# Patient Record
Sex: MALE | Race: WHITE
[De-identification: names, ages, dates, MRNs, and addresses within clinical notes are randomized per-mention and may not be internally consistent; named-entity substitution may affect disease eponyms.]

---

## 2017-01-11 ENCOUNTER — HOSPITAL ENCOUNTER (OUTPATIENT)
Dept: HOSPITAL 39 - AMB | Age: 62
Discharge: HOME | End: 2017-01-11
Attending: INTERNAL MEDICINE
Payer: COMMERCIAL

## 2017-01-11 VITALS — OXYGEN SATURATION: 95 % | TEMPERATURE: 97.6 F | DIASTOLIC BLOOD PRESSURE: 82 MMHG | SYSTOLIC BLOOD PRESSURE: 150 MMHG

## 2017-01-11 DIAGNOSIS — K21.9: ICD-10-CM

## 2017-01-11 DIAGNOSIS — Z79.899: ICD-10-CM

## 2017-01-11 DIAGNOSIS — E78.00: ICD-10-CM

## 2017-01-11 DIAGNOSIS — Z88.5: ICD-10-CM

## 2017-01-11 DIAGNOSIS — E11.9: ICD-10-CM

## 2017-01-11 DIAGNOSIS — Z12.11: Primary | ICD-10-CM

## 2017-01-11 DIAGNOSIS — Z86.010: ICD-10-CM

## 2017-01-11 DIAGNOSIS — Z88.8: ICD-10-CM

## 2017-01-11 DIAGNOSIS — I10: ICD-10-CM

## 2017-01-11 NOTE — OP
DATE OF PROCEDURE:  01/11/17



PREPROCEDURE DIAGNOSIS:

1.  History of colonic polyp.



POSTPROCEDURE DIAGNOSIS:

1.  History of colonic polyp.



PROCEDURE:

1.  Colonoscopy to the cecum.



SURGEON:  Ben Salcido MD.



ANESTHESIA:  Monitored anesthesia care.



FINDINGS:  With the patient under adequate sedation, digital exam showed normal 
anal canal with no rectal masses.  Normal sized prostate.  The rectum was 
unremarkable.  The sigmoid colon showed no clinically significant 
diverticulosis.  Descending colon normal, splenic flexure normal, transverse 
colon normal, ascending colon and cecum were reached with ileocecal valve 
visualized.  The appendix opening was identified.  No lesions seen in the 
entire colon.  Careful examination of both flexure areas did not show any 
abnormality.  



The colon preparation was good.  Examination of the colon was done to good 
advantage.  Total scope withdrawal time was 6 minutes.



IMPRESSION:  Totally normal colonoscopy with no evidence of recurrent colonic 
polyp.



RECOMMENDATION:  Followup colonoscopy in 5 to 7 years.



#[id number]



cc:  MD Ben Still MD
MTDD

## 2017-02-11 ENCOUNTER — HOSPITAL ENCOUNTER (OUTPATIENT)
Dept: HOSPITAL 39 - GMAL | Age: 62
Discharge: HOME | End: 2017-02-11
Attending: FAMILY MEDICINE
Payer: COMMERCIAL

## 2017-02-11 DIAGNOSIS — E29.1: ICD-10-CM

## 2017-02-11 DIAGNOSIS — E11.9: ICD-10-CM

## 2017-02-11 DIAGNOSIS — I10: Primary | ICD-10-CM

## 2017-02-21 ENCOUNTER — HOSPITAL ENCOUNTER (OUTPATIENT)
Dept: HOSPITAL 39 - GMAL | Age: 62
Discharge: HOME | End: 2017-02-21
Attending: FAMILY MEDICINE
Payer: COMMERCIAL

## 2017-02-21 DIAGNOSIS — R97.20: Primary | ICD-10-CM

## 2017-05-13 ENCOUNTER — HOSPITAL ENCOUNTER (OUTPATIENT)
Dept: HOSPITAL 39 - LAB | Age: 62
Discharge: HOME | End: 2017-05-13
Attending: FAMILY MEDICINE
Payer: COMMERCIAL

## 2017-05-13 DIAGNOSIS — E55.9: ICD-10-CM

## 2017-05-13 DIAGNOSIS — E78.2: Primary | ICD-10-CM

## 2017-05-13 DIAGNOSIS — E11.9: ICD-10-CM

## 2017-05-13 DIAGNOSIS — I10: ICD-10-CM

## 2017-09-16 ENCOUNTER — HOSPITAL ENCOUNTER (OUTPATIENT)
Dept: HOSPITAL 39 - GMAL | Age: 62
Discharge: HOME | End: 2017-09-16
Attending: FAMILY MEDICINE
Payer: COMMERCIAL

## 2017-09-16 DIAGNOSIS — E78.2: Primary | ICD-10-CM

## 2017-09-16 DIAGNOSIS — I10: ICD-10-CM

## 2017-09-16 DIAGNOSIS — E11.9: ICD-10-CM

## 2018-01-20 ENCOUNTER — HOSPITAL ENCOUNTER (OUTPATIENT)
Dept: HOSPITAL 39 - LAB.O | Age: 63
Discharge: HOME | End: 2018-01-20
Attending: FAMILY MEDICINE
Payer: COMMERCIAL

## 2018-01-20 DIAGNOSIS — E11.9: ICD-10-CM

## 2018-01-20 DIAGNOSIS — E78.2: Primary | ICD-10-CM

## 2018-01-20 DIAGNOSIS — I10: ICD-10-CM

## 2018-04-15 ENCOUNTER — HOSPITAL ENCOUNTER (EMERGENCY)
Dept: HOSPITAL 39 - ER | Age: 63
Discharge: HOME | End: 2018-04-15
Payer: COMMERCIAL

## 2018-04-15 VITALS — TEMPERATURE: 98.8 F

## 2018-04-15 VITALS — OXYGEN SATURATION: 96 % | DIASTOLIC BLOOD PRESSURE: 70 MMHG | SYSTOLIC BLOOD PRESSURE: 132 MMHG

## 2018-04-15 DIAGNOSIS — I10: ICD-10-CM

## 2018-04-15 DIAGNOSIS — R94.31: ICD-10-CM

## 2018-04-15 DIAGNOSIS — E11.9: ICD-10-CM

## 2018-04-15 DIAGNOSIS — E78.5: ICD-10-CM

## 2018-04-15 DIAGNOSIS — K80.50: Primary | ICD-10-CM

## 2018-04-15 PROCEDURE — 82550 ASSAY OF CK (CPK): CPT

## 2018-04-15 PROCEDURE — 85730 THROMBOPLASTIN TIME PARTIAL: CPT

## 2018-04-15 PROCEDURE — 85379 FIBRIN DEGRADATION QUANT: CPT

## 2018-04-15 PROCEDURE — 36415 COLL VENOUS BLD VENIPUNCTURE: CPT

## 2018-04-15 PROCEDURE — 85025 COMPLETE CBC W/AUTO DIFF WBC: CPT

## 2018-04-15 PROCEDURE — 84484 ASSAY OF TROPONIN QUANT: CPT

## 2018-04-15 PROCEDURE — 71045 X-RAY EXAM CHEST 1 VIEW: CPT

## 2018-04-15 PROCEDURE — 80048 BASIC METABOLIC PNL TOTAL CA: CPT

## 2018-04-15 PROCEDURE — 85610 PROTHROMBIN TIME: CPT

## 2018-04-15 PROCEDURE — 82553 CREATINE MB FRACTION: CPT

## 2018-04-15 PROCEDURE — 93005 ELECTROCARDIOGRAM TRACING: CPT

## 2018-04-15 RX ADMIN — NITROGLYCERIN ONE EA: 0.4 TABLET, ORALLY DISINTEGRATING SUBLINGUAL at 15:22

## 2018-04-15 RX ADMIN — NITROGLYCERIN ONE EA: 0.4 TABLET, ORALLY DISINTEGRATING SUBLINGUAL at 15:57

## 2018-04-15 NOTE — ED.PDOC
History of Present Illness





- General


Chief Complaint: Chest Pain/MI


Time Seen by Provider: 04/15/18 15:17


Source: patient, family


Exam Limitations: no limitations





- History of Present Illness


Initial Comments: 





Patient comes in with R sided sharp chest pain at the base of his costal angle 

since about 11 am.  Pain is there constantly but at times worsens to moderate/

severe.  Patient has some associated nausea and shortness of breath.  He had 

similar illness several years ago and work up did not find cause.  He does have 

known gallbladder stones but in the past it has not felt like this.  He has 

never had heart problems.  He has DM, HTN, Hyperlipidemia but no family history 

of cardiac disease.  He has no cough, congestion, fever, chills, or recent 

injury to the chest.  


Timing/Duration: 4-6 hours


Severity: moderate


Location: other - RL chest wall


Activities at Onset: rest


Prior Chest Pain/Cardiac Workup: no prior chest pain


Improving Factors: nothing


Worsening Factors: movement


Nitro Today/Relief: provided by ED, no relief


Aspirin Treatment Today: provided by ED


Associated Symptoms: nausea/vomiting, shortness of breath


Allergies/Adverse Reactions: 


Allergies





Morphine and Related Adverse Reaction (Intermediate, Verified 09/21/16 10:42)


 Nausea/Vomiting


 prolonged nausea and vomiting 


Opioids Adverse Reaction (Intermediate, Uncoded 09/21/16 10:55)


 Nausea/Vomiting


 Prolonged nausea and vomiting 








Home Medications: 


Ambulatory Orders





Glimepiride [Amaryl] 4 mg PO BID 09/20/16 


Lisinopril 10 mg PO DAILY 09/20/16 


Sitagliptin-Metformin HCl [Janumet] 1 tab PO BID 09/20/16 


Doxycycline (Monohydrate) [Doxycycline Monohydrate] 100 mg PO BID 01/10/17 


Levothyroxine Sodium 50 mcg PO DAILY 01/10/17 











Review of Systems





- Review of Systems


Constitutional: States: see HPI.  Denies: chills, fever


EENTM: States: no symptoms reported


Respiratory: States: short of breath.  Denies: cough, wheezing


Cardiology: States: chest pain.  Denies: edema, palpitations, syncope


Gastrointestinal/Abdominal: States: nausea.  Denies: abdominal pain, 

constipation, diarrhea, vomiting


Genitourinary: States: no symptoms reported


Musculoskeletal: States: no symptoms reported


Skin: States: no symptoms reported





Past Medical History (General)





- Patient Medical History


Hx Congestive Heart Failure: No


Hx Diabetes: Yes


Hx MRSA: No





Physical Exam





- Physical Exam


General Appearance: Alert, Anxious


Eyes, Ears, Nose, Throat Exam: PERRL/EOMI, normal ENT inspection, TMs normal, 

pharynx normal


Neck: non-tender, full range of motion, supple, normal inspection


Respiratory: chest non-tender, lungs clear, normal breath sounds, no 

respiratory distress, no accessory muscle use


Cardiovascular/Chest: normal peripheral pulses, regular rate, rhythm, no edema, 

no gallop, no JVD


Peripheral Pulses: radial,right: 2+


Gastrointestinal/Abdominal: normal bowel sounds, soft, rebound, tenderness, 

hernia - mildly tender to palpation on RUQ 


Extremity: normal range of motion, non-tender, no pedal edema


Neurologic: alert, oriented x 3





Progress





- Progress


Progress: 





04/15/18 18:44


 





04/15/18 15:30


EKG STAT 








 Laboratory Results











WBC  10.9 K/mm3 (4.8-10.8)  H  04/15/18  15:38    


 


RBC  5.83 M/mm3 (4.70-6.10)   04/15/18  15:38    


 


Hgb  17.4 gm/dL (14.0-18.0)   04/15/18  15:38    


 


Hct  50.8 % (42.0-52.0)   04/15/18  15:38    


 


MCV  87.1 fl (80.0-94.0)   04/15/18  15:38    


 


MCH  29.9 pg (27.0-31.0)   04/15/18  15:38    


 


MCHC  34.3 g/dL (33.0-37.0)   04/15/18  15:38    


 


RDW  13.5 % (11.5-14.5)   04/15/18  15:38    


 


Plt Count  253 K/mm3A (130-400)   04/15/18  15:38    


 


MPV  8.1 fl (7.40-10.4)   04/15/18  15:38    


 


Absolute Neuts (auto)  9.00 K/uL (1.8-6.8)  H  04/15/18  15:38    


 


Absolute Lymphs (auto)  1.10 K/uL (1.0-3.4)   04/15/18  15:38    


 


Absolute Monos (auto)  0.60 K/uL (0.2-0.8)   04/15/18  15:38    


 


Absolute Eos (auto)  0.20 K/uL (0.0-0.4)   04/15/18  15:38    


 


Absolute Basos (auto)  0.00 K/uL (0.0-0.1)   04/15/18  15:38    


 


Neutrophils %  82.2 % (42.0-78.0)  H  04/15/18  15:38    


 


Lymphocytes %  9.6 % (20.0-50.0)  L  04/15/18  15:38    


 


Monocytes %  5.9 % (2.0-9.0)   04/15/18  15:38    


 


Eosinophils %  2.0 % (1.0-5.0)   04/15/18  15:38    


 


Basophils %  0.3 % (0.0-2.0)   04/15/18  15:38    


 


PT  10.9 SECONDS (9.4-12.5)   04/15/18  15:38    


 


INR  0.940   04/15/18  15:38    


 


PTT (SP)  36.1 SECONDS (25.1-36.5)   04/15/18  15:38    


 


D-Dimer, Quantitative  < 230 ng/mL (0-230)   04/15/18  15:38    


 


Sodium  136 mmol/L (135-145)   04/15/18  15:38    


 


Potassium  3.9 mmol/L (3.6-5.0)   04/15/18  15:38    


 


Chloride  100 mmol/L (101-111)  L  04/15/18  15:38    


 


Carbon Dioxide  27 mmol/L (21-31)   04/15/18  15:38    


 


Anion Gap  12.9  (12-18)   04/15/18  15:38    


 


BUN  13 mg/dL (7-18)   04/15/18  15:38    


 


Creatinine  0.84 mg/dL (0.6-1.3)   04/15/18  15:38    


 


BUN/Creatinine Ratio  15.5  (10-20)   04/15/18  15:38    


 


Random Glucose  331 mg/dL ()  H  04/15/18  15:38    


 


Serum Osmolality  285.0 mOsm/L (275-295)   04/15/18  15:38    


 


Calcium  9.4 mg/dL (8.4-10.2)   04/15/18  15:38    


 


Magnesium  1.8 mg/dL (1.8-2.5)   04/15/18  15:38    


 


Creatine Kinase  94 IU/L ()   04/15/18  18:03    


 


CK-MB (CK-2)  5.6 ng/mL (0.0-4.4)  H*  04/15/18  18:03    


 


CK-MB (CK-2) %  Not Reportable   04/15/18  15:38    


 


Troponin I  0.03 ng/mL (0.01-0.05)   04/15/18  18:03    











04/15/18 18:44


Nitro did not help pain but GI cocktail did.  Discussed abnormal EKG with 

patient and likelihood that he has had some damage done at some time.  However, 

cardiac enzymes are negative x 2 here and pain is now gone.  He was given EKG 

copy to to take to his PCP to discuss Cardiology work up. 





Departure





- Departure


Clinical Impression: 


 Biliary colic





Disposition: Discharge to Home or Self Care


Condition: Good


Departure Forms:  ED Discharge - Pt. Copy, Patient Portal Self Enrollment


Instructions:  DI for Chest Pain


Diet: diabetic diet


Referrals: 


Clifford Murguia III, MD [Primary Care Provider] - 1-2 Weeks


Home Medications: 


Ambulatory Orders





Glimepiride [Amaryl] 4 mg PO BID 09/20/16 


Lisinopril 10 mg PO DAILY 09/20/16 


Sitagliptin-Metformin HCl [Janumet] 1 tab PO BID 09/20/16 


Doxycycline (Monohydrate) [Doxycycline Monohydrate] 100 mg PO BID 01/10/17 


Levothyroxine Sodium 50 mcg PO DAILY 01/10/17 








Additional Instructions: 


Follow up in 3-4 days with PCP to discuss possible work up for abnormal EKG.  

However, enzymes negative x 2 here and chest pain consistent with billiary 

collic.  Patient has known cholelithiasis.  Pain did not respond to nitro but 

did go away with GI cocktail.  Patient to return to ER for return of pain, 

shortness of breath, diaphoresis, intractable emesis.

## 2018-04-15 NOTE — RAD
PROCEDURE: XR CHEST 1 VIEW



HISTORY: chest pain



COMPARISON: 1/10/2017 



TECHNIQUE: 



Single projection of the chest was done.



FINDINGS:



The lung fields are well inflated .



There are no discrete airspace infiltrates, pneumothoraces or

pleural effusions.



The pulmonary vascularity is normal.



The cardiomediastinal silhouette is unremarkable for patient's

age and sex.



IMPRESSION: 



There is no acute pleural-parenchymal process seen in the imaged

lung fields.



Location of Interpretation: [facility]



Electronically signed by:  Matthew Morel MD  4/15/2018 3:58 PM CDT

Workstation: [id number]

## 2018-04-18 ENCOUNTER — HOSPITAL ENCOUNTER (OUTPATIENT)
Dept: HOSPITAL 39 - US | Age: 63
End: 2018-04-18
Attending: NURSE PRACTITIONER
Payer: COMMERCIAL

## 2018-04-18 DIAGNOSIS — K80.20: Primary | ICD-10-CM

## 2018-04-18 NOTE — US
EXAM DESCRIPTION: Gall Bladder



CLINICAL HISTORY: CALC OF GB W/O CHOLECYSTITIS W/O OBST



COMPARISON: None Available.



TECHNIQUE: Right upper quadrant ultrasound



FINDINGS:



Pancreas: Pancreatic bed is obscured by overlying bowel gas. No

fluid collection is seen in the expected location of the

pancreas.



Aorta/inferior vena cava: No aortic aneurysm. Inferior vena cava

is visible by the liver. Bowel gas obscures much of the length of

these vessels.



Liver: The liver is coarse in texture with increased echogenicity

consistent with diffuse hepatic steatosis. There is sparing near

the gallbladder. No focal liver lesion or intrahepatic bile duct

dilatation. No liver surface irregularity. Normal appearance of

the portal vein and hepatic veins.



Gallbladder: Gallbladder is abnormal containing small shadowing

stones within the lumen. Gallbladder wall is thickened measured

at 6 mm. Sonographic Culver sign is not known but these findings

are worrisome for acute cholecystitis. For clinically equivocal

cases, radionuclide hepatobiliary scan may be helpful.



Common bile duct: Normal caliber measuring 7.4 mm. Only a short

segment of the duct is visible. Bowel gas obscures the distal

duct. No stone is seen within the lumen.



Right kidney: Renal length is 11.1 cm. Normal cortical

echogenicity. Cortical thickness is normal. No hydronephrosis is

seen. No renal mass or shadowing calculus.



Sonographic Culver sign was reported as negative.



IMPRESSION:



Gallstones with thick walled gallbladder. See above.



Diffuse hepatic steatosis.







Electronically signed by:  Mitul Finn MD  4/18/2018 10:22

AM CDT Workstation: [id number]

## 2018-04-21 ENCOUNTER — HOSPITAL ENCOUNTER (EMERGENCY)
Dept: HOSPITAL 39 - ER | Age: 63
Discharge: ANOTHER HOSPITAL | End: 2018-04-21
Payer: COMMERCIAL

## 2018-04-21 VITALS — SYSTOLIC BLOOD PRESSURE: 117 MMHG | DIASTOLIC BLOOD PRESSURE: 61 MMHG | TEMPERATURE: 97.5 F | OXYGEN SATURATION: 94 %

## 2018-04-21 DIAGNOSIS — Z79.84: ICD-10-CM

## 2018-04-21 DIAGNOSIS — I10: ICD-10-CM

## 2018-04-21 DIAGNOSIS — E07.9: ICD-10-CM

## 2018-04-21 DIAGNOSIS — E11.65: ICD-10-CM

## 2018-04-21 DIAGNOSIS — K80.00: ICD-10-CM

## 2018-04-21 DIAGNOSIS — E78.00: ICD-10-CM

## 2018-04-21 DIAGNOSIS — A41.9: Primary | ICD-10-CM

## 2018-04-21 PROCEDURE — 85730 THROMBOPLASTIN TIME PARTIAL: CPT

## 2018-04-21 PROCEDURE — 36415 COLL VENOUS BLD VENIPUNCTURE: CPT

## 2018-04-21 PROCEDURE — 83880 ASSAY OF NATRIURETIC PEPTIDE: CPT

## 2018-04-21 PROCEDURE — 84484 ASSAY OF TROPONIN QUANT: CPT

## 2018-04-21 PROCEDURE — 87040 BLOOD CULTURE FOR BACTERIA: CPT

## 2018-04-21 PROCEDURE — 83690 ASSAY OF LIPASE: CPT

## 2018-04-21 PROCEDURE — 74176 CT ABD & PELVIS W/O CONTRAST: CPT

## 2018-04-21 PROCEDURE — 85379 FIBRIN DEGRADATION QUANT: CPT

## 2018-04-21 PROCEDURE — 82550 ASSAY OF CK (CPK): CPT

## 2018-04-21 PROCEDURE — 36416 COLLJ CAPILLARY BLOOD SPEC: CPT

## 2018-04-21 PROCEDURE — 74019 RADEX ABDOMEN 2 VIEWS: CPT

## 2018-04-21 PROCEDURE — 82150 ASSAY OF AMYLASE: CPT

## 2018-04-21 PROCEDURE — 85610 PROTHROMBIN TIME: CPT

## 2018-04-21 PROCEDURE — 82553 CREATINE MB FRACTION: CPT

## 2018-04-21 PROCEDURE — 83605 ASSAY OF LACTIC ACID: CPT

## 2018-04-21 PROCEDURE — 82948 REAGENT STRIP/BLOOD GLUCOSE: CPT

## 2018-04-21 PROCEDURE — 85025 COMPLETE CBC W/AUTO DIFF WBC: CPT

## 2018-04-21 PROCEDURE — 81001 URINALYSIS AUTO W/SCOPE: CPT

## 2018-04-21 PROCEDURE — 93005 ELECTROCARDIOGRAM TRACING: CPT

## 2018-04-21 PROCEDURE — 80053 COMPREHEN METABOLIC PANEL: CPT

## 2018-04-21 NOTE — ED.PDOC
History of Present Illness





- General


Chief Complaint: Abdominal Pain


Stated Complaint: abd pains, distension


Time Seen by Provider: 04/21/18 00:32


Source: patient


Exam Limitations: no limitations





- History of Present Illness


Initial Comments: 





the patient is 62-year-old  male presenting to the emergency room 

secondary to abdominal pain that has been ongoing for the better part of a 

week.  He was seen here approximately 5 days ago and had a White blood cell 

count of 10,000 at the time.  He has known gallstones that were diagnosed more 

than a year ago apparently. He has had several episodes of nausea and vomiting 

today.  he is unsure what his blood sugars have been.  He is a type II 

diabetic.  He has taken very little oral intake today.  He is diaphoretic and 

obviously uncomfortable upon arrival here.  He is tachycardic with a heart rate 

in the 120s.  He has diffuse peritonitis on exam.  He is obese.  He does have 

an umbilical hernia. no history of any pancreatitis.  No jaundice.  Eating does 

seem to make symptoms worse.


Timing/Duration: unsure


Severity: severe


Improving Factors: nothing


Worsening Factors: eating


Associated Symptoms: diaphoresis, fever/chills, loss of appetite, malaise, 

nausea/vomiting, weakness


Allergies/Adverse Reactions: 


Allergies





Morphine and Related Adverse Reaction (Intermediate, Verified 09/21/16 10:42)


 Nausea/Vomiting


 prolonged nausea and vomiting 


Opioids Adverse Reaction (Intermediate, Uncoded 09/21/16 10:55)


 Nausea/Vomiting


 Prolonged nausea and vomiting 








Home Medications: 


Ambulatory Orders





Glimepiride [Amaryl] 4 mg PO BID 09/20/16 


Lisinopril 10 mg PO DAILY 09/20/16 


Sitagliptin-Metformin HCl [Janumet] 1 tab PO BID 09/20/16 


Doxycycline (Monohydrate) [Doxycycline Monohydrate] 100 mg PO BID 01/10/17 


Levothyroxine Sodium 50 mcg PO DAILY 01/10/17 











Review of Systems





- Review of Systems


Constitutional: States: diaphoresis, malaise


EENTM: States: no symptoms reported


Respiratory: States: no symptoms reported


Cardiology: States: no symptoms reported


Gastrointestinal/Abdominal: States: abdominal pain, nausea, vomiting


Genitourinary: States: no symptoms reported


Musculoskeletal: States: no symptoms reported


Skin: States: no symptoms reported


Neurological: States: no symptoms reported


Endocrine: States: no symptoms reported


All other Systems: No Change from Baseline





Past Medical History (General)





- Patient Medical History


Hx Stroke: No


Hx Cardiac Disorders: Yes - High cholesterol


Hx Congestive Heart Failure: No


Hx Hypertension: Yes


Hx Thyroid Disease: Yes


Hx Diabetes: Yes


Hx MRSA: No


Surgical History: other





- Vaccination History


Hx Influenza Vaccination: No


Hx Pneumococcal Vaccination: No





- Social History


Hx Tobacco Use: No


Hx Alcohol Use: Yes


Hx Substance Use Treatment: No





Family Medical History





- Family History


  ** Father


Family History: Unknown





Physical Exam





- Physical Exam


General Appearance: Alert, Comfortable, No apparent distress


Eye Exam: bilateral normal


Ears, Nose, Throat: hearing grossly normal, normal ENT inspection, normal 

pharynx


Neck: full range of motion, supple


Respiratory: lungs clear, normal breath sounds, no respiratory distress, no 

accessory muscle use


Cardiovascular/Chest: normal peripheral pulses, no edema, tachycardia


Peripheral Pulses: radial,right: 2+, radial,left: 2+, dorsalis pedis,right: 2+, 

dorsalis pedis,left: 2+


Gastrointestinal/Abdominal: other - mildly distended.  Morbidly obese. He does 

have tenderness to palpation diffusely over his abdomen.


Rectal Exam: deferred


Back Exam: normal inspection, no vertebral tenderness


Extremity: normal range of motion, non-tender, normal inspection, no pedal edema

, normal capillary refill


Neurologic: CNs II-XII nml as tested, alert, normal mood/affect, oriented x 3


Skin Exam: diaphoresis


Comments: 





 Vital Signs - 24 hr











  04/21/18 04/21/18 04/21/18





  00:50 00:56 01:29


 


Temperature 97.8 F  


 


Pulse Rate [ 122 H  121 H





left]   


 


Respiratory 22 22 22





Rate   


 


Blood Pressure 117/67  107/59





[left]   


 


O2 Sat by Pulse 96  95





Oximetry   














  04/21/18





  02:47


 


Temperature 


 


Pulse Rate [ 110 H





left] 


 


Respiratory 22





Rate 


 


Blood Pressure 109/68





[left] 


 


O2 Sat by Pulse 95





Oximetry 














Progress





- Progress


Progress: 





04/21/18 02:52


the patient is a 62-year-old  male presenting with acute emphysematous 

cholecystitis that is becoming septic.  The patient has received 2 L of IV 

fluids and a dose of Zosyn.  He is currently receiving Flagyl additionally.  He 

is nothing by mouth.  He is refusing opiate pain medications at this time.  

Nausea appears to be controlled.  He is tachycardic but has not exhibited any 

hypotension to this point.  Blood cultures have been done.  He does exhibit a 

right bundle branch block on his EKG.  White blood cell count is markedly 

elevated at 32,000 and his lactic acid is elevated at 5.  The patient is being 

transferred for evaluation with surgery.  Additionally the patient is markedly 

hyperglycemic.  He has received 15 units of insulin lispro here.  We are 

currently rechecking a blood sugar.  he has as of yet, not received any steroid 

dosing in light of the hyperglycemia.  Transferred for higher level of care.  

critical care time spent in treatment of this patient's acute processes along 

with arrangements for care and transfer to higher level of care excluding 

otherwise billable procedures is 40 minutes.





- Results/Orders


Results/Orders: 





 





04/21/18 01:05


BLOOD CULTURE Stat 





04/21/18 02:00


EKG STAT sinus tachycardia at a rate of 112 bpm.  He does have a right bundle 

branch block.  No definitive acute ST segment changes can otherwise be 

interpreted.  There is a left axis deviation.





CT scan abdomen and pelvis shows a large edematous emphysematous acute 

cholecystitis.  No evidence of any obstruction in the extrahepatic ducts. 

Pancreas appears essentially normal. See report for full details.














 Laboratory Results - last 24 hr











  04/21/18 04/21/18 04/21/18





  01:00 01:00 01:00


 


WBC    32.3 H*


 


RBC    5.34


 


Hgb    15.7


 


Hct    46.2


 


MCV    86.6


 


MCH    29.4


 


MCHC    34.0


 


RDW    13.6


 


Plt Count    327


 


MPV    8.0


 


Absolute Neuts (auto)    Not Reportable


 


Absolute Lymphs (auto)    Not Reportable


 


Absolute Monos (auto)    Not Reportable


 


Absolute Eos (auto)    Not Reportable


 


Neutrophils %    Not Reportable


 


Neutrophils % (Manual)    87.0 H


 


Lymphocytes %    Not Reportable


 


Lymphocytes % (Manual)    3.0


 


Monocytes %    Not Reportable


 


Monocytes % (Manual)    2.0


 


Eosinophils %    Not Reportable


 


Basophils %    Not Reportable


 


Band Neutrophils    8.0 H


 


Platelet Estimate    Normal


 


Normal RBC Morphology    Normal rbc morph


 


PT   14.0 H 


 


INR   1.210 


 


PTT (SP)   32.4 


 


D-Dimer, Quantitative   2028 H* 


 


Sodium  131 L  


 


Potassium  3.6  


 


Chloride  97 L  


 


Carbon Dioxide  19 L  


 


Anion Gap  18.6 H  


 


BUN  23 H  


 


Creatinine  2.04 H  


 


BUN/Creatinine Ratio  11.3  


 


Random Glucose  468 H*  


 


Serum Osmolality  286.7  


 


Lactic Acid   


 


Calcium  8.2 L  


 


Total Bilirubin  1.2 H  


 


AST  24  


 


ALT  25  


 


Alkaline Phosphatase  94  


 


Creatine Kinase  80  


 


CK-MB (CK-2)  3.6  


 


CK-MB (CK-2) %  Not Reportable  


 


Troponin I  0.06 H  


 


B-Natriuretic Peptide  45.0  


 


Serum Total Protein  6.4  


 


Albumin  3.1 L  


 


Globulin  3.3  


 


Albumin/Globulin Ratio  0.9 L  


 


Amylase  21 L  


 


Lipase  20 L  


 


Urine Color   


 


Urine Appearance   


 


Urine pH   


 


Ur Specific Gravity   


 


Urine Protein   


 


Urine Glucose (UA)   


 


Urine Ketones   


 


Urine Blood   


 


Urine Nitrite   


 


Urine Bilirubin   


 


Urine Urobilinogen   


 


Ur Leukocyte Esterase   


 


Urine RBC   


 


Urine WBC   


 


Ur Epithelial Cells   


 


Calcium Oxalate Crystal   


 


Amorphous Sediment   


 


Urine Bacteria   


 


Hyaline Casts   


 


Fine Granular Casts   


 


Urine Mucus   














  04/21/18 04/21/18





  01:25 01:50


 


WBC  


 


RBC  


 


Hgb  


 


Hct  


 


MCV  


 


MCH  


 


MCHC  


 


RDW  


 


Plt Count  


 


MPV  


 


Absolute Neuts (auto)  


 


Absolute Lymphs (auto)  


 


Absolute Monos (auto)  


 


Absolute Eos (auto)  


 


Neutrophils %  


 


Neutrophils % (Manual)  


 


Lymphocytes %  


 


Lymphocytes % (Manual)  


 


Monocytes %  


 


Monocytes % (Manual)  


 


Eosinophils %  


 


Basophils %  


 


Band Neutrophils  


 


Platelet Estimate  


 


Normal RBC Morphology  


 


PT  


 


INR  


 


PTT (SP)  


 


D-Dimer, Quantitative  


 


Sodium  


 


Potassium  


 


Chloride  


 


Carbon Dioxide  


 


Anion Gap  


 


BUN  


 


Creatinine  


 


BUN/Creatinine Ratio  


 


Random Glucose  


 


Serum Osmolality  


 


Lactic Acid  5.0 H* 


 


Calcium  


 


Total Bilirubin  


 


AST  


 


ALT  


 


Alkaline Phosphatase  


 


Creatine Kinase  


 


CK-MB (CK-2)  


 


CK-MB (CK-2) %  


 


Troponin I  


 


B-Natriuretic Peptide  


 


Serum Total Protein  


 


Albumin  


 


Globulin  


 


Albumin/Globulin Ratio  


 


Amylase  


 


Lipase  


 


Urine Color   Angie


 


Urine Appearance   Cloudy


 


Urine pH   5.0


 


Ur Specific Gravity   >= 1.030


 


Urine Protein   100 H


 


Urine Glucose (UA)   100 H


 


Urine Ketones   15 H


 


Urine Blood   Trace-lysed H


 


Urine Nitrite   Negative


 


Urine Bilirubin   Moderate


 


Urine Urobilinogen   1.0


 


Ur Leukocyte Esterase   Negative


 


Urine RBC   0-1


 


Urine WBC   5-10 H


 


Ur Epithelial Cells   1-3


 


Calcium Oxalate Crystal   1+


 


Amorphous Sediment   1+


 


Urine Bacteria   1+


 


Hyaline Casts   1-3


 


Fine Granular Casts   1-3


 


Urine Mucus   Trace














Departure





- Departure


Clinical Impression: 


 Emphysematous cholecystitis, Hyperglycemia





Sepsis


Qualifiers:


 Sepsis type: sepsis due to unspecified organism Qualified Code(s): A41.9 - 

Sepsis, unspecified organism





Disposition: Transfer to Hospital


Referrals: 


Clifford Murguia III, MD [Primary Care Provider] - 1-2 Weeks


Home Medications: 


Ambulatory Orders





Glimepiride [Amaryl] 4 mg PO BID 09/20/16 


Lisinopril 10 mg PO DAILY 09/20/16 


Sitagliptin-Metformin HCl [Janumet] 1 tab PO BID 09/20/16 


Doxycycline (Monohydrate) [Doxycycline Monohydrate] 100 mg PO BID 01/10/17 


Levothyroxine Sodium 50 mcg PO DAILY 01/10/17 











Transfer to Outside Facility





- Transfer Information


Accepting Provider:: dr disla


Accepting Facility: [facility]


Reason for Transfer: required specialist not available

## 2018-04-21 NOTE — RAD
EXAM: TWO VIEW SINGLE and UPRIGHT ABDOMEN AND PA CHEST

RADIOGRAPHS



CLINICAL INDICATION: Abdominal pain for 4 days.



COMPARISON: No recent comparisons are available.



FINDINGS: Cardiac size and pulmonary vasculature are normal.

Lungs are clear. No pleural effusions or pneumothorax. Bones of

the chest are intact on this single view.



Several loops of gas-filled moderately distended bowel in the mid

to left abdomen suspicious for reactive small bowel ileus

secondary to underlying inflammatory process. No mechanical bowel

obstruction or free peritoneal gas.



IMPRESSION: 

1. Suspect left abdominal reactive small bowel ileus.

Postcontrast abdomen and pelvic CT would prove useful for further

evaluation of possible underlying abdominal inflammatory process.

2. No mechanical bowel obstruction or extraluminal bowel gas.

3. Normal PA chest radiograph.



Electronically signed by:  Damion Ruiz MD  4/21/2018 1:41 AM

CDT Workstation: [id number]

## 2018-04-21 NOTE — CT
NONCONTRAST ABDOMEN AND PELVIC CT EXAMINATION.



HISTORY: Right upper quadrant abdominal pain. Suspect biliary

disease.



COMPARISONS: Today's abdomen radiographs. Gallbladder ultrasound

of April 2018.



PROCEDURE: Using helical technique, thin section axial images

were performed through the abdomen and pelvis without the

administration of intravenous or oral contrast material.



FINDINGS: There are calcified stones and gas within the

fluid-filled inflamed gallbladder. No intrahepatic biliary ductal

dilatation. The pancreas appears grossly normal on this

noncontrast examination.



The adrenal glands, kidneys, renal collecting systems, ureters

and urinary bladder are grossly normal on this noncontrast

examination.



No evidence of appendicitis, diverticulitis, inflammatory bowel

disease, bowel obstruction, extraluminal bowel gas or

retroperitoneal hemorrhage. No ascites, free pelvic fluid or

evidence of intra-abdominal abscess on this noncontrast

examination.



The liver, spleen, pancreas, stomach and duodenum are grossly

normal on this noncontrast examination. Mild atherosclerotic

calcification at the aortic bifurcation without aneurysm.

Greatest AP diameter of the infrarenal abdominal aorta measures

1.9 cm



Bones appear normal for age.



IMPRESSION:

1. Cholelithiasis with emphysematous cholecystitis.



Findings discussed with Dr. Collier on 04/21/2018 at 0230 hours

central time.



This exam was performed according to our departmental

dose-optimization program, which includes automated exposure

control, adjustment of the mA and/or kV according to patient size

and/or use of iterative reconstruction technique.



Electronically signed by:  Damion Ruiz MD  4/21/2018 2:29 AM

CDT Workstation: [id number]

## 2018-07-14 ENCOUNTER — HOSPITAL ENCOUNTER (OUTPATIENT)
Dept: HOSPITAL 39 - LAB.O | Age: 63
End: 2018-07-14
Attending: FAMILY MEDICINE
Payer: COMMERCIAL

## 2018-07-14 DIAGNOSIS — E78.2: ICD-10-CM

## 2018-07-14 DIAGNOSIS — E11.9: ICD-10-CM

## 2018-07-14 DIAGNOSIS — Z12.5: ICD-10-CM

## 2018-07-14 DIAGNOSIS — I10: Primary | ICD-10-CM

## 2018-07-14 DIAGNOSIS — E03.9: ICD-10-CM

## 2018-11-10 ENCOUNTER — HOSPITAL ENCOUNTER (OUTPATIENT)
Dept: HOSPITAL 39 - LAB.O | Age: 63
End: 2018-11-10
Attending: FAMILY MEDICINE
Payer: COMMERCIAL

## 2018-11-10 DIAGNOSIS — E55.9: ICD-10-CM

## 2018-11-10 DIAGNOSIS — E11.42: Primary | ICD-10-CM

## 2018-11-10 DIAGNOSIS — I10: ICD-10-CM

## 2018-11-10 DIAGNOSIS — E53.8: ICD-10-CM

## 2018-11-10 DIAGNOSIS — E78.2: ICD-10-CM

## 2019-01-19 ENCOUNTER — HOSPITAL ENCOUNTER (OUTPATIENT)
Dept: HOSPITAL 39 - LAB.O | Age: 64
End: 2019-01-19
Attending: FAMILY MEDICINE
Payer: COMMERCIAL

## 2019-01-19 DIAGNOSIS — I10: Primary | ICD-10-CM

## 2019-01-19 DIAGNOSIS — E78.2: ICD-10-CM

## 2019-01-25 ENCOUNTER — HOSPITAL ENCOUNTER (INPATIENT)
Dept: HOSPITAL 39 - MS | Age: 64
LOS: 3 days | Discharge: HOME | DRG: 603 | End: 2019-01-28
Attending: NURSE PRACTITIONER | Admitting: NURSE PRACTITIONER
Payer: COMMERCIAL

## 2019-01-25 DIAGNOSIS — Z87.891: ICD-10-CM

## 2019-01-25 DIAGNOSIS — K21.9: ICD-10-CM

## 2019-01-25 DIAGNOSIS — I10: ICD-10-CM

## 2019-01-25 DIAGNOSIS — E11.9: ICD-10-CM

## 2019-01-25 DIAGNOSIS — L03.211: Primary | ICD-10-CM

## 2019-01-25 DIAGNOSIS — E78.5: ICD-10-CM

## 2019-01-25 DIAGNOSIS — Z79.84: ICD-10-CM

## 2019-01-25 DIAGNOSIS — E29.1: ICD-10-CM

## 2019-01-25 DIAGNOSIS — G47.33: ICD-10-CM

## 2019-01-25 DIAGNOSIS — K76.0: ICD-10-CM

## 2019-01-25 DIAGNOSIS — E03.9: ICD-10-CM

## 2019-01-25 DIAGNOSIS — Z88.5: ICD-10-CM

## 2019-01-25 RX ADMIN — INSULIN LISPRO SCH UNITS: 100 INJECTION, SOLUTION INTRAVENOUS; SUBCUTANEOUS at 18:21

## 2019-01-25 RX ADMIN — ENOXAPARIN SODIUM SCH MG: 40 INJECTION, SOLUTION INTRAVENOUS; SUBCUTANEOUS at 21:02

## 2019-01-25 RX ADMIN — ACETAMINOPHEN, ASPIRIN (NSAID) AND CAFFEINE PRN EA: 250; 250; 65 TABLET, FILM COATED ORAL at 14:24

## 2019-01-25 RX ADMIN — ACETAMINOPHEN, ASPIRIN (NSAID) AND CAFFEINE PRN EA: 250; 250; 65 TABLET, FILM COATED ORAL at 21:02

## 2019-01-25 RX ADMIN — Medication SCH: at 21:11

## 2019-01-25 RX ADMIN — INSULIN LISPRO SCH: 100 INJECTION, SOLUTION INTRAVENOUS; SUBCUTANEOUS at 21:11

## 2019-01-25 RX ADMIN — VANCOMYCIN HYDROCHLORIDE SCH MLS/HR: 500 INJECTION, POWDER, LYOPHILIZED, FOR SOLUTION INTRAVENOUS at 13:03

## 2019-01-25 RX ADMIN — PANTOPRAZOLE SODIUM SCH MG: 40 INJECTION, POWDER, FOR SOLUTION INTRAVENOUS at 13:05

## 2019-01-25 NOTE — HP
SUPERVISING PHYSICIAN:  Sridhar Briones M.D.



CHIEF COMPLAINT:  Pain to his left face.



HISTORY OF PRESENT ILLNESS:  This is a 63 year-old male patient who had acne on 
his left lip.  He said he "popped it" on Saturday.  On Sunday there was a lot of
redness and drainage as well as some swelling.  He saw Dr. Murguia, his primary 
care physician, on Monday and he was put on Bactrim, and was told to return to 
the clinic if it was not better in 4 days.  He saw Dr. Murguia today and the 
infection was worse.  Dr. Murguia sent the patient over here for IV therapy and 
cellulitis of the left side of the face failed outpatient therapy.  The patient 
said that he had nausea but no vomiting.  He had no fever, but he had a lot of 
left sided facial pain and it was difficult to chew.  He was admitted to the 
hospital in stable condition.



PAST MEDICAL HISTORY: 

1.   Gastroesophageal reflux disease.

2.   Hyperlipidemia.

3.   Hypothyroidism.

4.   Obstructive sleep apnea.

5.   Testosterone deficiency.

6.   Hypertension.

7.   Type 2 diabetes.

8.   Fatty liver disease.



PAST SURGICAL HISTORY:

1.   Tonsillectomy.

2.   [place] teeth extraction.

3.   Left surgical hand repair.

4.   Left carpal tunnel release.

5.   Laparoscopic cholecystectomy for gangrenous gallbladder in 04/2018.



OUTPATIENT MEDICATIONS:  

1.   Atorvastatin.

2.   Synthroid.

3.   Glimepiride.

4.   Metformin.

5.   Losartan.

6.   Nitroglycerin.

7.   Testosterone Cypionate.



ALLERGIES:  OPIOIDS AND LISINOPRIL.



FAMILY HISTORY:  Positive for prostate cancer and hypertension.



SOCIAL HISTORY:  He lives in [place].  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  He is .  He has 1 child.  He has a past history of smoking 
cigars and pipes, but he quit in 2014.  He drinks alcoholic beverages rarely.  
He denies any illicit drug use.



REVIEW OF SYSTEMS:  Negative except as per History of Present Illness.



PHYSICAL EXAMINATION: 



VITAL SIGNS:  Temperature 98, heart rate 85, blood pressure 143/82, respiratory 
rate 20, O2 sat 95% on room air.



GENERAL:  This is an obese 63 year-old male patient who is lying in his  
hospital bed.  He is in no acute distress.



HEENT:  Normocephalic and atraumatic.  Pupils are equal and reactive.  
Oropharynx is clear.



NECK:  Supple without mass.



RESPIRATORY:  Essentially clear to auscultation bilaterally.



CHEST:  There is equal rise and fall of the chest with inspiration and 
expiration.



CARDIOVASCULAR:  Regular rate and rhythm.



GASTROINTESTINAL:  Abdomen is soft, nondistended, non-tender.  Bowel sounds are 
positive.



EXTREMITIES:  No clubbing, cyanosis or edema.



NEUROLOGIC:  He is awake, alert and oriented times three.  Cranial nerves II-XII
are grossly intact.



SKIN:  There is an erythematous area to the left side of the patient's lip and 
lower face that is quite edematous.  The area of erythema is approximately 3 cm.
 It does have a small pustule that is open and draining a small amount of 
purulent fluid.  It is tender to palpation.



LABORATORY:  WBCs are 8.2, hemoglobin 15.4, hematocrit 45.5, platelets 254.  ESR
is 20.  Electrolytes are basically within normal limits with C reactive protein 
of 3.  Glucose 210.  All other labs and films have been reviewed via the EMR.



ASSESSMENT: 

1.   Cellulitis of the face failed outpatient treatment.

2.   Gastroesophageal reflux disease.

3.   Hypothyroidism.

4.   Hypertension.

5.   Diabetes mellitus type 2.

6.   Obstructive sleep apnea.

7.   Hyperlipidemia.



PLAN:  We will admit the patient to the hospital.  I will give him 1 liter of IV
fluids and will start him on vancomycin per Pharmacy protocol.  I have ordered 
blood cultures as well as culture of the wound.  I will also get a CT scan of 
the face in the morning to rule out any underlying issues.  He is on blood sugar
checks a.c. and h.s. with sliding scale NovoLog insulin coverage.  His home 
medications will be restarted.  Will monitor his cultures closely so he can be 
discharged on an oral antibiotic.  We will continue to monitor the patient 
closely and follow as needed.



#[id number]

[facility]

## 2019-01-26 RX ADMIN — VANCOMYCIN HYDROCHLORIDE SCH MLS/HR: 500 INJECTION, POWDER, LYOPHILIZED, FOR SOLUTION INTRAVENOUS at 13:12

## 2019-01-26 RX ADMIN — PANTOPRAZOLE SODIUM SCH MG: 40 INJECTION, POWDER, FOR SOLUTION INTRAVENOUS at 06:06

## 2019-01-26 RX ADMIN — LOSARTAN POTASSIUM SCH MG: 25 TABLET ORAL at 09:03

## 2019-01-26 RX ADMIN — INSULIN LISPRO SCH UNITS: 100 INJECTION, SOLUTION INTRAVENOUS; SUBCUTANEOUS at 12:00

## 2019-01-26 RX ADMIN — Medication SCH ML: at 09:04

## 2019-01-26 RX ADMIN — Medication SCH ML: at 20:46

## 2019-01-26 RX ADMIN — KETOROLAC TROMETHAMINE SCH MG: 30 INJECTION, SOLUTION INTRAMUSCULAR at 14:40

## 2019-01-26 RX ADMIN — VANCOMYCIN HYDROCHLORIDE SCH MLS/HR: 500 INJECTION, POWDER, LYOPHILIZED, FOR SOLUTION INTRAVENOUS at 00:34

## 2019-01-26 RX ADMIN — INSULIN LISPRO SCH UNITS: 100 INJECTION, SOLUTION INTRAVENOUS; SUBCUTANEOUS at 07:48

## 2019-01-26 RX ADMIN — INSULIN LISPRO SCH UNITS: 100 INJECTION, SOLUTION INTRAVENOUS; SUBCUTANEOUS at 21:28

## 2019-01-26 RX ADMIN — INSULIN LISPRO SCH: 100 INJECTION, SOLUTION INTRAVENOUS; SUBCUTANEOUS at 16:49

## 2019-01-26 RX ADMIN — ACETAMINOPHEN, ASPIRIN (NSAID) AND CAFFEINE PRN EA: 250; 250; 65 TABLET, FILM COATED ORAL at 07:51

## 2019-01-26 RX ADMIN — ATORVASTATIN CALCIUM SCH MG: 20 TABLET, FILM COATED ORAL at 20:46

## 2019-01-26 RX ADMIN — ENOXAPARIN SODIUM SCH MG: 40 INJECTION, SOLUTION INTRAVENOUS; SUBCUTANEOUS at 20:46

## 2019-01-26 RX ADMIN — GLIMEPIRIDE SCH MG: 2 TABLET ORAL at 17:24

## 2019-01-26 RX ADMIN — KETOROLAC TROMETHAMINE SCH MG: 30 INJECTION, SOLUTION INTRAMUSCULAR at 19:55

## 2019-01-26 NOTE — PN
DATE:  01/26/19



SUPERVISING PHYSICIAN:  Sridhar Briones M.D.



SUBJECTIVE:  The patient is sitting up in his bed.  Family is at the bedside.  
We discussed his CT results.  He says his face is still quite sore and has 
difficulty eating, but he feels like it is "coming to a head."  Denies chest 
pain, shortness of breath, nausea, vomiting, diarrhea or constipation.



OBJECTIVE:  VITAL SIGNS: Temperature 98.1, heart rate 82, blood pressure 145/82,
respiratory rate 20, O2 sat 95% on room air.  RESPIRATORY: Essentially clear to 
auscultation bilaterally.  CARDIAC: Regular rate and rhythm.  GASTROINTESTINAL: 
Abdomen is soft, nondistended, non-tender.  Bowel sounds are positive.  SKIN: 
The area on his left cheek is less edematous than yesterday.  The area of 
erythema is approximately 1.5 cm around a central draining pustule.  There is 
very little drainage today.  There is no fluctuance.  NEUROLOGIC: He is awake, 
alert and oriented times three.



LABORATORY:  CBC is basically within normal limits.  Blood sugars have run 
between 154 and 222.  Electrolytes are within normal limits.  CT of his face 
shows an area of abnormal attenuation within the subcutaneous fat anterior to 
the left mandibular ramus compatible with an infectious/inflammatory process.  
Less than 1 cm area of low attenuation within this area could represent a small 
amount of fluid/early or residual abscess formation is a consideration.  All 
other labs and films have been reviewed via the EMR.



ASSESSMENT: 

1.   Cellulitis of the face failed outpatient treatment.

2.   Gastroesophageal reflux disease.

3.   Hypothyroidism.

4.   Hypertension.

5.   Diabetes mellitus type 2.

6.   Obstructive sleep apnea.

7.   Hyperlipidemia.



PLAN:  We will continue present supportive care.  I will continue his vancomycin
per Pharmacy protocol.  We will monitor his cultures as they become available.  
Hopefully will have a preliminary result tomorrow.  Hopefully there will be a 
p.o. antibiotic to be discharged on by Monday, but it will depend on the culture
results and when they are available.  I have encourage good pulmonary hygiene.  
We will continue to monitor closely and follow as needed.



#[id number]

[facility]

## 2019-01-26 NOTE — CT
EXAM DESCRIPTION: 



Maxillofacial w/wo Contrast



CLINICAL HISTORY: 



facial cellulitis



COMPARISON: 



None Available.



TECHNIQUE: 



Contiguous axial images of the face were obtained before and

after the administration of intravenous contrast.  This exam was

performed according to our departmental dose-optimization

program, which includes automated exposure control, adjustment of

the mA and/or kV according to patient size and/or use of

iterative reconstruction technique.



FINDINGS: 



Area of abnormal attenuation within the subcutaneous fat anterior

to the left mandibular ramus compatible with an

infectious/inflammatory process. Less than 1 cm area of

low-attenuation within this area could represent a small amount

of fluid/early or residual abscess formation is a consideration. 



Metallic artifact at patient's mouth degrades several images.

Parotid glands are within normal limits. Retrobulbar fat is

within normal limits. There is no orbital emphysema. There is

atherosclerosis. Airway is patent. Prevertebral soft tissues are

within normal limits. There is minimal mucoperiosteal thickening

of the right maxillary sinus compatible with chronic sinusitis

changes. 



IMPRESSION: 



Area of abnormal attenuation within the subcutaneous fat anterior

to the left mandibular ramus compatible with an

infectious/inflammatory process. Less than 1 cm area of

low-attenuation within this area could represent a small amount

of fluid/early or residual abscess formation is a consideration. 



Electronically signed by:  Almas Nguyen MD  1/26/2019 12:03 PM

CST Workstation: [id number]

## 2019-01-27 RX ADMIN — INSULIN LISPRO SCH: 100 INJECTION, SOLUTION INTRAVENOUS; SUBCUTANEOUS at 17:32

## 2019-01-27 RX ADMIN — KETOROLAC TROMETHAMINE SCH MG: 30 INJECTION, SOLUTION INTRAMUSCULAR at 13:41

## 2019-01-27 RX ADMIN — GLIMEPIRIDE SCH MG: 2 TABLET ORAL at 07:47

## 2019-01-27 RX ADMIN — PANTOPRAZOLE SODIUM SCH MG: 40 INJECTION, POWDER, FOR SOLUTION INTRAVENOUS at 06:11

## 2019-01-27 RX ADMIN — VANCOMYCIN HYDROCHLORIDE SCH MLS/HR: 500 INJECTION, POWDER, LYOPHILIZED, FOR SOLUTION INTRAVENOUS at 00:54

## 2019-01-27 RX ADMIN — LOSARTAN POTASSIUM SCH MG: 25 TABLET ORAL at 09:32

## 2019-01-27 RX ADMIN — Medication SCH ML: at 21:44

## 2019-01-27 RX ADMIN — INSULIN LISPRO SCH: 100 INJECTION, SOLUTION INTRAVENOUS; SUBCUTANEOUS at 21:41

## 2019-01-27 RX ADMIN — KETOROLAC TROMETHAMINE SCH MG: 30 INJECTION, SOLUTION INTRAMUSCULAR at 07:47

## 2019-01-27 RX ADMIN — LEVOTHYROXINE SODIUM SCH MG: 100 TABLET ORAL at 06:11

## 2019-01-27 RX ADMIN — ATORVASTATIN CALCIUM SCH MG: 20 TABLET, FILM COATED ORAL at 21:42

## 2019-01-27 RX ADMIN — ENOXAPARIN SODIUM SCH MG: 40 INJECTION, SOLUTION INTRAVENOUS; SUBCUTANEOUS at 21:43

## 2019-01-27 RX ADMIN — Medication SCH ML: at 10:33

## 2019-01-27 RX ADMIN — Medication SCH ML: at 17:31

## 2019-01-27 RX ADMIN — INSULIN LISPRO SCH: 100 INJECTION, SOLUTION INTRAVENOUS; SUBCUTANEOUS at 11:47

## 2019-01-27 RX ADMIN — INSULIN LISPRO SCH: 100 INJECTION, SOLUTION INTRAVENOUS; SUBCUTANEOUS at 07:47

## 2019-01-27 RX ADMIN — GLIMEPIRIDE SCH MG: 2 TABLET ORAL at 17:31

## 2019-01-27 RX ADMIN — KETOROLAC TROMETHAMINE SCH MG: 30 INJECTION, SOLUTION INTRAMUSCULAR at 01:51

## 2019-01-27 RX ADMIN — VANCOMYCIN HYDROCHLORIDE SCH MLS/HR: 500 INJECTION, POWDER, LYOPHILIZED, FOR SOLUTION INTRAVENOUS at 13:40

## 2019-01-27 NOTE — PN
DATE:  01/27/19



SUPERVISING PHYSICIAN:  Sridhar Briones M.D.



SUBJECTIVE:  The patient is sitting on his bed.  He is cross-stitching.  He has 
much less pain in his face than he did yesterday.  The Toradol really helped.  
We discussed his discharge plan in that we are waiting for his cultures to be 
resulted prior to discharge.  He denied any shortness of breath, chest pain, 
nausea, vomiting or diarrhea.



OBJECTIVE:  VITAL SIGNS: Temperature 99, heart rate 65, blood pressure 155/88, 
respiratory rate 20, O2 sat 94% on room air.  RESPIRATORY: Essentially clear to 
auscultation bilaterally.  Diminished at the bases.  CARDIAC: Regular rate and 
rhythm.  GASTROINTESTINAL: Abdomen is soft, nondistended, non-tender.  Bowel 
sounds are positive.  NEUROLOGIC: He is awake, alert and oriented times three.  
SKIN: The pustule on the left side of his face near his left lip is only 
minimally erythematous.  He also has a small amount of edema to the left cheek 
that has improved significantly from yesterday.  The pustule has no drainage.  
It is somewhat crusted over.  It is still slightly tender to palpation.



LABORATORY:  Blood sugars have run between 114 and 222.  Preliminary blood 
cultures show no growth after 48 hours.  Wound culture is still pending.  All 
other labs and films have been reviewed via the EMR.



ASSESSMENT: 

1.   Cellulitis of the face failed outpatient treatment.

2.   Gastroesophageal reflux disease.

3.   Hypothyroidism.

4.   Hypertension.

5.   Diabetes mellitus type 2.

6.   Obstructive sleep apnea.

7.   Hyperlipidemia.



PLAN:  We will continue present supportive care, including encouraging good 
pulmonary hygiene.  He will continue vancomycin per Pharmacy protocol.  I will 
check his labs, including an ESR tomorrow.  Monitor his wound cultures for 
assistance with antibiotic prescriptions on discharge.  Will continue to monitor
closely and follow as needed.



#[id number]

[facility]

## 2019-01-28 VITALS — OXYGEN SATURATION: 95 % | SYSTOLIC BLOOD PRESSURE: 171 MMHG | TEMPERATURE: 98.3 F | DIASTOLIC BLOOD PRESSURE: 81 MMHG

## 2019-01-28 RX ADMIN — LOSARTAN POTASSIUM SCH MG: 25 TABLET ORAL at 08:00

## 2019-01-28 RX ADMIN — INSULIN LISPRO SCH UNITS: 100 INJECTION, SOLUTION INTRAVENOUS; SUBCUTANEOUS at 07:13

## 2019-01-28 RX ADMIN — VANCOMYCIN HYDROCHLORIDE SCH MLS/HR: 500 INJECTION, POWDER, LYOPHILIZED, FOR SOLUTION INTRAVENOUS at 01:05

## 2019-01-28 RX ADMIN — ACETAMINOPHEN, ASPIRIN (NSAID) AND CAFFEINE PRN EA: 250; 250; 65 TABLET, FILM COATED ORAL at 02:27

## 2019-01-28 RX ADMIN — LEVOTHYROXINE SODIUM SCH MG: 100 TABLET ORAL at 06:00

## 2019-01-28 RX ADMIN — Medication SCH ML: at 08:00

## 2019-01-28 RX ADMIN — PANTOPRAZOLE SODIUM SCH MG: 40 INJECTION, POWDER, FOR SOLUTION INTRAVENOUS at 06:00

## 2019-01-28 RX ADMIN — GLIMEPIRIDE SCH MG: 2 TABLET ORAL at 07:17

## 2019-01-28 NOTE — DS
SUPERVISING PHYSICIAN:  Dru Monsivais MD



ADMISSION DIAGNOSIS:

1.   Cellulitis of the face, failed outpatient treatment.

2.   Gastroesophageal reflux disease.

3.   Hypothyroidism.

4.   Hypertension.

5.   Diabetes mellitus, type 2.

6.   Obstructive sleep apnea.

7.   Hyperlipidemia.



DISCHARGE DIAGNOSIS: 

1.   Cellulitis of the face, failed outpatient treatment.

2.   Gastroesophageal reflux disease.

3.   Hypothyroidism.

4.   Hypertension.

5.   Diabetes mellitus, type 2.

6.   Obstructive sleep apnea.

7.   Hyperlipidemia.



HOSPITAL COURSE: This is a 63-year-old male patient who had acne on his left 
lower lip/face.  He stated he "popped it" on Saturday prior to admission.  On 
Sunday, there was a lot of redness and drainage as well as some swelling.  He 
saw Dr. Murguia on Monday and he was given Bactrim, however, he failed to improve 
over the next four days.  Therefore, he was referred for direct admission for 
failed outpatient therapy.  The wound was cultured upon admission.  He was 
placed on IV vancomycin at that time.  Over the next several days, his 
cellulitis improved to near resolution.  His culture came back with 
sensitivities as MRSA with sensitivity to Rifampin, Bactrim and vancomycin.  I 
did discuss with Dr. Murguia the cultures and sensitivities and he agreed the 
patient could go home back on the Bactrim that he was on prior to admission due 
to the fact that he is greatly improved.  Additionally, we will add doxycycline 
to his therapy.  Dr. Murguia wants to see him towards the end of this week and I 
have relayed that to the patient.  Activity as tolerated.  Diet will not be 
changed.  I have sent prescription for four more days of Bactrim to [facility] as 
well as 10 days of doxycycline.  



#[id number]

[facility]

## 2019-12-04 ENCOUNTER — HOSPITAL ENCOUNTER (OUTPATIENT)
Dept: HOSPITAL 39 - GMAL | Age: 64
End: 2019-12-04
Attending: FAMILY MEDICINE
Payer: COMMERCIAL

## 2019-12-04 DIAGNOSIS — E55.9: ICD-10-CM

## 2019-12-04 DIAGNOSIS — E53.8: ICD-10-CM

## 2019-12-04 DIAGNOSIS — Z00.01: Primary | ICD-10-CM

## 2020-01-09 ENCOUNTER — HOSPITAL ENCOUNTER (INPATIENT)
Dept: HOSPITAL 39 - CT | Age: 65
LOS: 3 days | Discharge: HOME | DRG: 195 | End: 2020-01-12
Attending: NURSE PRACTITIONER | Admitting: NURSE PRACTITIONER
Payer: COMMERCIAL

## 2020-01-09 DIAGNOSIS — J45.909: ICD-10-CM

## 2020-01-09 DIAGNOSIS — Z88.8: ICD-10-CM
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
## 2020-01-09 DIAGNOSIS — E11.9: ICD-10-CM

## 2020-01-09 DIAGNOSIS — J15.9: ICD-10-CM

## 2020-01-09 DIAGNOSIS — Z88.5: ICD-10-CM

## 2020-01-09 DIAGNOSIS — I10: ICD-10-CM

## 2020-01-09 DIAGNOSIS — J10.00: Primary | ICD-10-CM

## 2020-01-09 DIAGNOSIS — Z87.891: ICD-10-CM

## 2020-01-09 DIAGNOSIS — K76.0: ICD-10-CM

## 2020-01-09 DIAGNOSIS — R91.1: ICD-10-CM

## 2020-01-09 DIAGNOSIS — E78.5: ICD-10-CM

## 2020-01-09 PROCEDURE — BW241ZZ COMPUTERIZED TOMOGRAPHY (CT SCAN) OF CHEST AND ABDOMEN USING LOW OSMOLAR CONTRAST: ICD-10-PCS

## 2020-01-09 RX ADMIN — ENOXAPARIN SODIUM SCH MG: 40 INJECTION, SOLUTION INTRAVENOUS; SUBCUTANEOUS at 16:43

## 2020-01-09 RX ADMIN — SODIUM CHLORIDE SCH MLS/HR: 9 INJECTION, SOLUTION INTRAVENOUS at 16:40

## 2020-01-09 RX ADMIN — Medication SCH ML: at 20:31

## 2020-01-09 RX ADMIN — GLIMEPIRIDE SCH MG: 2 TABLET ORAL at 16:43

## 2020-01-09 RX ADMIN — LEVOFLOXACIN SCH MLS/HR: 5 INJECTION, SOLUTION INTRAVENOUS at 16:36

## 2020-01-09 RX ADMIN — INSULIN LISPRO SCH: 100 INJECTION, SOLUTION INTRAVENOUS; SUBCUTANEOUS at 16:37

## 2020-01-09 RX ADMIN — OSELTAMIVIR PHOSPHATE SCH MG: 75 CAPSULE ORAL at 21:07

## 2020-01-09 RX ADMIN — GUAIFENESIN SCH MG: 600 TABLET, EXTENDED RELEASE ORAL at 21:06

## 2020-01-09 RX ADMIN — INSULIN LISPRO SCH: 100 INJECTION, SOLUTION INTRAVENOUS; SUBCUTANEOUS at 21:02

## 2020-01-09 RX ADMIN — ISODIUM CHLORIDE SCH MG: 0.03 SOLUTION RESPIRATORY (INHALATION) at 20:33

## 2020-01-09 NOTE — CT
EXAM DESCRIPTION: 

Chest w/wo Contrast



CLINICAL HISTORY: 

VIRAL PNEUMONIA



COMPARISON: 

None.



TECHNIQUE: 

Pre and postcontrast CT images of the chest are obtained using

standard imaging protocol. This exam was performed according to

our departmental dose-optimization program, which includes

automated exposure control, adjustment of the mA and/or kV

according to patient size and/or use of iterative reconstruction

technique .



FINDINGS: 

Heart shows severe coronary artery calcifications. Scattered

calcified plaque of the thoracic aorta.

Mild scattered calcific atherosclerotic disease of the thoracic

aorta without aneurysmal dilatation.



Mild right paratracheal lymphadenopathy. Largest lymph node

measures 12 mm short axis. Right greater than left hilar lymph

nodes are seen measuring maximum 2.1 x 1.4 cm on the right.



Trace bilateral pleural effusions in the dependent portions of

the chest are seen.



Lungs are normally aerated.

Scattered patchy areas of alveolar airspace densities are seen.

Several ill-defined noncalcified pulmonary nodules are seen

measuring maximum 10 mm. Alveolar airspace densities are more

pronounced in the lower lobes. Scattered areas of groundglass

attenuation in the pulmonary parenchyma seen. No bronchiectasis.

Mild bronchial wall thickening in the lower lobes peripherally.



Osseous structures show no aggressive bony lesions. Mild

spondylitic changes of the spine.



Visualized upper abdomen shows enlarged liver measuring 19.3 cm

with heterogeneous decreased attenuation compatible with fatty

infiltration.



IMPRESSION: 

Bilateral airspace pulmonary infiltrates consistent with

bilateral pneumonia. Consider atypical pneumonia or viral

pneumonitis.



Multiple ill-defined pulmonary nodules. Most severe: 10.0 mm

solid pulmonary nodule. Recommend a non-contrast Chest CT at 3-6

months, then consider another non-contrast Chest CT at 18-24

months.

These guidelines do not apply to immunocompromised patients and

patients with cancer. Follow up in patients with significant

comorbidities as clinically warranted. For lung cancer screening,

adhere to Lung-RADS guidelines. Reference: Radiology. 2017;

284(1):228-43.



Trace bilateral pleural effusions.



Enlarged mediastinal and hilar lymphadenopathy is likely

reactive.









Electronically signed by:  Yazan Blackwood MD  1/9/2020 11:23 AM CST

Workstation: [id number]

## 2020-01-09 NOTE — HP
SUPERVISING PHYSICIAN:  Stainslav Collier MD



CHIEF COMPLAINT:  Upper respiratory symptoms, fever and flu, type A.



HISTORY OF PRESENT ILLNESS:  This is a 64-year-old male patient who [facility].  Due to continuing cough and upper respiratory 
like symptoms, he had been seen in clinic on Monday and was diagnosed with flu 
A.  He had a temperature that was 100.7 in the clinic.  He was given some 
azithromycin and injection of Rocephin and cough medicine.  He had a chest x-ray
at that time showing bibasilar pneumonia.  He actually had been treated for a 
cough by his primary care physician, Dr. Murguia, since October and there were 
some questionable pulmonary nodules on his chest CT.  He returned to the clinic 
today and his symptoms were much worse.  A CT scan of his lungs was also 
completed and showed bilateral airspace pulmonary infiltrates consistent with 
bilateral pneumonia, atypical pneumonia or viral pneumonitis with multiple ill-
defined pulmonary nodules, most severe is 10 mm solid pulmonary nodule, 
recommended non-contrast CT in 3 to 6 months.  I was called for direct admission
to the hospital for failed outpatient treatment.  The patient was admitted in 
stable condition to the hospital.  



PAST MEDICAL HISTORY:  

1.  Diabetes mellitus, type 2.

2.  Hyperlipidemia.

3.  Hypertension.

4.  Asthma.

5.  Fatty liver disease.



PAST SURGICAL HISTORY:  

1.  Tonsillectomy.

2.  Some teeth removal.

3.  Left hand surgery.

4.  Left carpal tunnel release.

5.  Cholecystectomy.



OUTPATIENT MEDICATIONS:  Per the EMR and awaiting verification.  



ALLERGIES:  TESSALON PERLES, MORPHINE AND OPIOIDS.



FAMILY HISTORY:  Positive for prostate cancer and hypertension.



SOCIAL HISTORY:  He is .  He lives in [place].  He quit smoking in 2014.  
He drinks alcoholic beverages very infrequently and denies any illicit drug use.
He is a  at a [employer] in [place]. 



REVIEW OF SYSTEMS: 

GENERAL: Positive for for fever, fatigue, negative for weight changes.  

HEENT: Positive for sinus symptoms, rhinorrhea.  Negative for ear pain, vision 
changes or sore throat.  

RESPIRATORY: Positive for wheezing, coughing or shortness of breath.  

CARDIAC: Negative for chest pain, palpitations or tachycardia.  

GASTROINTESTINAL: Negative for nausea, vomiting, diarrhea, constipation.

GENITOURINARY: Negative for hematuria, dysuria or polyuria.

SKIN:  Negative for lesions or rashes.  

NEUROLOGIC: Negative for headache, weakness or seizures.  



PHYSICAL EXAMINATION: 



VITAL SIGNS: Temperature 96.8.  Heart rate 118.  Blood pressure 171/99.  
Respiratory rate 15.  O2 saturation 98% on room air.



GENERAL:  This is a 64-year-old male patient who is sitting up in his chair in 
his hospital room.  He is in mild respiratory distress.  



HEENT:  Normocephalic, atraumatic.  Pupils are equal and reactive.  Oropharynx 
is clear.  



NECK:  Supple without mass. 



RESPIRATORY:  Diminished breath sounds throughout with rhonchi scattered 
throughout and a few expiratory wheezes.



CHEST:  There is equal rise and fall of the chest with inspiration and 
expiration.  



CARDIOVASCULAR:  Regular rate and rhythm.  



GASTROINTESTINAL: Abdomen is soft, nondistended, nontender.  Bowel sounds are 
positive.  



EXTREMITIES:  No cyanosis, clubbing or edema.  



NEUROLOGIC: Awake, alert and oriented times three.  Cranial nerves II-XII are 
grossly intact as tested.



LABORATORY:  His CBC is unremarkable.  Sodium 133.  The remained of his 
electrolytes are basically within normal limits.  Bilirubin is slightly high at 
1.2.  AST is elevated at 46.  CT is as per history of present illness.  



IMPRESSION:

1.  Viral pneumonitis versus bilateral bibasilar bacterial pneumonia, most 
likely

     community acquired.  He has failed outpatient treatment.

2.  Multiple pulmonary nodules, largest being 10 mm, per CT scan.

3.  Recent diagnosis of influenza, type 1.

4.  Diabetes mellitus, type 2.

5.  Hypertension.

6.  Asthma.

7.  Cough that is chronic and has been going on since October.



PLAN:  The patient has been admitted to the hospital.  The pneumonia guidelines 
have been initiated.  We will give aggressive pulmonary hygiene as well as start
him on IV Levaquin.  He will have Mucinex for cough and I will also give him 
some Tamiflu.  Lab and chest x-ray will be ordered for tomorrow.  His home 
medications will be restarted as soon as they have been verified.  I put him on 
sliding scale insulin protocol, Lovenox for DVT prophylaxis. I do have concerns 
that he is a previous smoke and also may have some environmental factors 
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 He
is exposed to metal dust/shavings. Will continue to monitor the patient closely 
and follow as needed.  



#[id number]

[facility]

## 2020-01-10 RX ADMIN — GLIMEPIRIDE SCH MG: 2 TABLET ORAL at 07:28

## 2020-01-10 RX ADMIN — ENOXAPARIN SODIUM SCH MG: 40 INJECTION, SOLUTION INTRAVENOUS; SUBCUTANEOUS at 16:38

## 2020-01-10 RX ADMIN — LEVOFLOXACIN SCH MLS/HR: 5 INJECTION, SOLUTION INTRAVENOUS at 16:37

## 2020-01-10 RX ADMIN — GLIMEPIRIDE SCH MG: 2 TABLET ORAL at 16:37

## 2020-01-10 RX ADMIN — INSULIN LISPRO SCH: 100 INJECTION, SOLUTION INTRAVENOUS; SUBCUTANEOUS at 20:48

## 2020-01-10 RX ADMIN — Medication SCH ML: at 20:49

## 2020-01-10 RX ADMIN — SODIUM CHLORIDE SCH MLS/HR: 9 INJECTION, SOLUTION INTRAVENOUS at 16:37

## 2020-01-10 RX ADMIN — GUAIFENESIN SCH MG: 600 TABLET, EXTENDED RELEASE ORAL at 09:02

## 2020-01-10 RX ADMIN — ISODIUM CHLORIDE SCH MG: 0.03 SOLUTION RESPIRATORY (INHALATION) at 16:35

## 2020-01-10 RX ADMIN — GUAIFENESIN SCH MG: 600 TABLET, EXTENDED RELEASE ORAL at 20:49

## 2020-01-10 RX ADMIN — INSULIN LISPRO SCH: 100 INJECTION, SOLUTION INTRAVENOUS; SUBCUTANEOUS at 07:23

## 2020-01-10 RX ADMIN — OSELTAMIVIR PHOSPHATE SCH MG: 75 CAPSULE ORAL at 20:49

## 2020-01-10 RX ADMIN — LEVOTHYROXINE SODIUM SCH MG: 100 TABLET ORAL at 06:29

## 2020-01-10 RX ADMIN — ISODIUM CHLORIDE SCH MG: 0.03 SOLUTION RESPIRATORY (INHALATION) at 08:16

## 2020-01-10 RX ADMIN — ISODIUM CHLORIDE SCH MG: 0.03 SOLUTION RESPIRATORY (INHALATION) at 19:56

## 2020-01-10 RX ADMIN — INSULIN LISPRO SCH: 100 INJECTION, SOLUTION INTRAVENOUS; SUBCUTANEOUS at 11:39

## 2020-01-10 RX ADMIN — INSULIN LISPRO SCH: 100 INJECTION, SOLUTION INTRAVENOUS; SUBCUTANEOUS at 16:36

## 2020-01-10 RX ADMIN — ISODIUM CHLORIDE SCH MG: 0.03 SOLUTION RESPIRATORY (INHALATION) at 12:43

## 2020-01-10 RX ADMIN — Medication SCH ML: at 09:02

## 2020-01-10 RX ADMIN — SODIUM CHLORIDE SCH MLS/HR: 9 INJECTION, SOLUTION INTRAVENOUS at 03:52

## 2020-01-10 RX ADMIN — OSELTAMIVIR PHOSPHATE SCH MG: 75 CAPSULE ORAL at 09:02

## 2020-01-10 NOTE — PN
SUPERVISING PHYSICIAN:  Stanislav Collier MD



DATE:  01/10/20



SUBJECTIVE:  The patient is sitting up in his room in a chair.  He is talking to
his wife.  He does not have oxygen on at this time and he seems to be in no 
distress.  He does say he gets short of breath with exertion and he also feels 
like he may sleep better with oxygen, at least at this time.  Otherwise, no 
chest pain, nausea, vomiting, diarrhea or constipation.  



OBJECTIVE:  

VITAL SIGNS:  Temperature 98.  Heart rate 88.  Blood pressure 135/76.  
Respiratory rate 18.  O2 saturation 94% on room air.  

RESPIRATORY:  A few scattered rhonchi throughout.  No expiratory wheezing.  He 
is diminished at the bases.

CARDIAC: Regular rate and rhythm.  

GASTROINTESTINAL: Abdomen is soft, nondistended, nontender.  Bowel sounds are 
positive.  

NEUROLOGIC: Awake, alert and oriented times three.  



LABORATORY:  CBC is unremarkable.  Chemistry shows electrolytes basically within
normal limits with BUN 14, creatinine 0.95.  Glucose 115, bilirubin slightly 
improved to 1.1.  Preliminary blood cultures are negative to date.  Sputum 
culture is pending.  His chest x-ray report is unavailable.  



ASSESSMENT: 

1.  Viral pneumonitis versus bilateral bibasilar bacterial pneumonia, most 
likely

     community acquired.  He has failed outpatient treatment.

2.  Multiple pulmonary nodules, largest being 10 mm, per CT scan.

3.  Recent diagnosis of influenza, type 1.

4.  Diabetes mellitus, type 2.

5.  Hypertension.

6.  Asthma.

7.  Cough that is chronic and has been going on since October.



PLAN:  We will continue present supportive care including his IV antibiotics and
his aggressive pulmonary hygiene.  Clinically, he is improved and I do have some
concerns about his pulmonary nodules and we recommend that he see a 
pulmonologist on discharge.  If he continues to need oxygen, I will do an 
ambulation study, but for now I have encouraged him to get up and walk.  We will
continue to monitor the patient closely and follow as needed.  



#[id number]

[facility]

## 2020-01-11 RX ADMIN — Medication SCH ML: at 20:40

## 2020-01-11 RX ADMIN — OSELTAMIVIR PHOSPHATE SCH MG: 75 CAPSULE ORAL at 20:40

## 2020-01-11 RX ADMIN — INSULIN LISPRO SCH: 100 INJECTION, SOLUTION INTRAVENOUS; SUBCUTANEOUS at 07:22

## 2020-01-11 RX ADMIN — ISODIUM CHLORIDE SCH MG: 0.03 SOLUTION RESPIRATORY (INHALATION) at 16:45

## 2020-01-11 RX ADMIN — SODIUM CHLORIDE SCH MLS/HR: 9 INJECTION, SOLUTION INTRAVENOUS at 04:13

## 2020-01-11 RX ADMIN — GLIMEPIRIDE SCH: 2 TABLET ORAL at 07:32

## 2020-01-11 RX ADMIN — LEVOTHYROXINE SODIUM SCH MG: 100 TABLET ORAL at 06:34

## 2020-01-11 RX ADMIN — ISODIUM CHLORIDE SCH MG: 0.03 SOLUTION RESPIRATORY (INHALATION) at 09:30

## 2020-01-11 RX ADMIN — INSULIN LISPRO SCH: 100 INJECTION, SOLUTION INTRAVENOUS; SUBCUTANEOUS at 16:52

## 2020-01-11 RX ADMIN — OSELTAMIVIR PHOSPHATE SCH MG: 75 CAPSULE ORAL at 09:48

## 2020-01-11 RX ADMIN — INSULIN LISPRO SCH UNITS: 100 INJECTION, SOLUTION INTRAVENOUS; SUBCUTANEOUS at 21:20

## 2020-01-11 RX ADMIN — Medication SCH ML: at 09:48

## 2020-01-11 RX ADMIN — INSULIN LISPRO SCH: 100 INJECTION, SOLUTION INTRAVENOUS; SUBCUTANEOUS at 11:49

## 2020-01-11 RX ADMIN — GUAIFENESIN SCH MG: 600 TABLET, EXTENDED RELEASE ORAL at 09:48

## 2020-01-11 RX ADMIN — ISODIUM CHLORIDE SCH MG: 0.03 SOLUTION RESPIRATORY (INHALATION) at 20:17

## 2020-01-11 RX ADMIN — LEVOFLOXACIN SCH MLS/HR: 5 INJECTION, SOLUTION INTRAVENOUS at 15:26

## 2020-01-11 RX ADMIN — ISODIUM CHLORIDE SCH MG: 0.03 SOLUTION RESPIRATORY (INHALATION) at 12:56

## 2020-01-11 RX ADMIN — ENOXAPARIN SODIUM SCH MG: 40 INJECTION, SOLUTION INTRAVENOUS; SUBCUTANEOUS at 17:20

## 2020-01-11 RX ADMIN — GUAIFENESIN SCH MG: 600 TABLET, EXTENDED RELEASE ORAL at 20:40

## 2020-01-11 RX ADMIN — GLIMEPIRIDE SCH MG: 2 TABLET ORAL at 17:20

## 2020-01-11 RX ADMIN — SODIUM CHLORIDE SCH MLS/HR: 9 INJECTION, SOLUTION INTRAVENOUS at 17:27

## 2020-01-12 VITALS — OXYGEN SATURATION: 97 % | SYSTOLIC BLOOD PRESSURE: 132 MMHG | TEMPERATURE: 98.4 F | DIASTOLIC BLOOD PRESSURE: 75 MMHG

## 2020-01-12 RX ADMIN — INSULIN LISPRO SCH: 100 INJECTION, SOLUTION INTRAVENOUS; SUBCUTANEOUS at 11:41

## 2020-01-12 RX ADMIN — SODIUM CHLORIDE SCH MLS/HR: 9 INJECTION, SOLUTION INTRAVENOUS at 04:18

## 2020-01-12 RX ADMIN — GUAIFENESIN SCH MG: 600 TABLET, EXTENDED RELEASE ORAL at 08:44

## 2020-01-12 RX ADMIN — ISODIUM CHLORIDE SCH: 0.03 SOLUTION RESPIRATORY (INHALATION) at 14:16

## 2020-01-12 RX ADMIN — GLIMEPIRIDE SCH MG: 2 TABLET ORAL at 07:29

## 2020-01-12 RX ADMIN — LEVOTHYROXINE SODIUM SCH MG: 100 TABLET ORAL at 06:02

## 2020-01-12 RX ADMIN — INSULIN LISPRO SCH UNITS: 100 INJECTION, SOLUTION INTRAVENOUS; SUBCUTANEOUS at 07:18

## 2020-01-12 RX ADMIN — ISODIUM CHLORIDE SCH MG: 0.03 SOLUTION RESPIRATORY (INHALATION) at 08:25

## 2020-01-12 RX ADMIN — OSELTAMIVIR PHOSPHATE SCH MG: 75 CAPSULE ORAL at 08:44

## 2020-01-12 RX ADMIN — Medication SCH ML: at 08:44

## 2020-01-12 NOTE — PN
SUPERVISING PHYSICIAN:  Stanislav Collier MD



DATE:  01/11/2020



SUBJECTIVE:  The patient is sitting up on the side of the bed.  He is just 
starting to ambulate in the halls and does get does get short of breath but it 
is much less severe.  He is still coughing up quite a bit but has no complaints 
of chest pain, nausea or vomiting or constipation.



OBJECTIVE:  

VITAL SIGNS:  Temperature 97.7.  Heart rate 85.  Blood pressure 145/85.  
Respiratory rate 18.  O2 saturation 96% on room air.  

RESPIRATORY:  He has a few expiratory wheezes on his upper and lower left lung 
with a few scattered rhonchi throughout, somewhat diminished at the bases.

CARDIAC: Regular rate and rhythm.  

GASTROINTESTINAL: Abdomen is soft, nondistended, nontender.  Bowel sounds are 
positive.  

NEUROLOGIC: Awake, alert and oriented times three.  



LABORATORY:   Blood sugars have run between 87 and 261.  His preliminary blood 
cultures show no growth after 48 hours.  Sputum culture is pending.  All other 
labs and films have been reviewed via the EMR. 



ASSESSMENT: 

1.  Viral pneumonitis versus bilateral bibasilar bacterial pneumonia, most 
likely

     community acquired.  He has failed outpatient treatment.

2.  Multiple pulmonary nodules, largest being 10 mm, per CT scan.

3.  Recent diagnosis of influenza, type 1.

4.  Diabetes mellitus, type 2.

5.  Hypertension.

6.  Asthma.

7.  Cough that is chronic and has been going on since October.



PLAN:  We will continue present supportive care and continue his aggressive 
pulmonary hygiene.  I will order lab and x-ray for in the morning.  I will need 
a pulmonary consultation on discharge.  I will also follow his cultures as the 
results become available.  I will give him a small dose of IV steroids.  I 
believe will help with his wheezing.  Hopefully, we can discharge tomorrow.  We 
will continue to monitor the patient closely and follow as needed.  



#[id number]

[facility]

## 2020-01-12 NOTE — RAD
: 1955.



TECHNIQUE: PA and lateral views of the chest. Comparison: January

10, 2020 chest x-ray and 2020 chest CT that showed

multifocal infiltrates.



Clinical history: pna.



Heart size: Normal.

Lungs: Ill-defined infiltrates at the lung bases predominantly on

the left radiographically with some interval clearing.

Pleura: No pleural effusion. No pneumothorax.

Mediastinum and helen: Unremarkable.

Skeletal: Unremarkable.



IMPRESSION:

1. Improving aeration at the left base.



Electronically signed by:  Pedro Ruth MD  2020 8:16 AM

CST Workstation: [id number]

## 2020-01-17 NOTE — DS
SUPERVISING PHYSICIAN:  Stanislav Collier MD



DISCHARGE DIAGNOSIS: 

1.  Viral pneumonitis versus bilateral bibasilar bacterial pneumonia, most 
likely

     community acquired.  He has failed outpatient treatment.

2.  Multiple pulmonary nodules, largest being 10 mm per CT scan.

3.  Recent diagnosis of influenza, type 1.

4.  Diabetes mellitus, type 2, on medications.

5.  Hypertension, on medications.

6.  Asthma.

7.  Cough that is chronic and has been going on since October.



HISTORY OF PRESENT ILLNESS: This is a 64-year-old male patient who was seen at 
[facility] on the day of admission.  He had a continuing cough 
with upper respiratory infection like symptoms.  He had been seen in clinic on 
the prior Monday and was diagnosed with flu A.  He had a temperature that was 
100.7 in the clinic.  He was given a Z-Logan and injection of Rocephin as well as 
cough medicine on Monday.  His chest x-ray at that time showed bibasilar 
pneumonia.  He actually had been treated for a cough and upper respiratory type 
symptoms by his primary care physician, Dr. Murguia, since October and there were 
some questionable pulmonary nodules on his chest CT.  On his return visit on the
date of admission to the hospital, he was seen in clinic and his symptoms were 
much worse.  A CT scan of his lungs was also completed and showed bibasilar 
airspace pulmonary infiltrates consistent with bilateral pneumonia, atypical 
pneumonia or viral pneumonitis with multiple ill-defined pulmonary nodules, most
severe is 10 mm solid pulmonary nodule, recommended non-contrast followup CT in 
3 to 6 months.  He was directly admitted in stable condition to the hospital.  



HOSPITAL COURSE: He was admitted to the hospital and pneumonia guidelines were 
initiated.  He was given aggressive pulmonary hygiene including scheduled and 
p.r.n. nebulizer treatments as well as IV Levaquin.  He had Mucinex for his 
cough.  He was also continued on his Tamiflu.  His lab and x-rays were monitored
closely.  His home medications were restarted.  He was given a sliding protocol 
and Lovenox for DVT prophylaxis.  He has a history of smoking previously, but 
there were also some environment factors to consider that may have contributed 
to his illness.  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 
metal dust/shavings.  He progressively improved over the next 24 to 48 hours.  
Initially, he required oxygen and then that was titrated off.  Although he 
continued with shortness of breath with exertion, after another 24 hours of 
treatment, he was able to walk in the hallways without oxygen and without 
getting short of breath.  He was encouraged to see pulmonary on discharge.  He 
continued to have a cough that was treated with Tessalon Perles.  He also does 
continue to complain of shortness of breath, much less severe, and his oxygen 
saturations remained stable in the low to mid 90s.  His cultures were not 
complete as of discharge and today, he will be discharged home in stable 
condition.



LABORATORY:  WBCs remained stable at 6,100 with a stable hemoglobin at 15.5 and 
hematocrit 45.6.  Blood sugars ran between 63 and 261.  Electrolytes were 
basically within normal limits.  His bilirubin was slightly elevated, but down 
to 1.1.  His blood cultures were negative after 3 days.  His sputum culture was 
pending.  



RADIOLOGY:  Reports are per the history of present illness.  His followup chest 
x-ray showed improving aeration of the left base.



DISCHARGE PLAN:  The patient will be discharged in stable condition.  He is to 
resume his previous diet and increase his activity as tolerated.  He is to 
followup with Dr. Murguia on 01/15/20 at 9:45 AM.  In addition to his routine 
medications, he is to continue 5 additional days of Levaquin 750.  He is to 
return to the hospital or followup with Dr. Murguia for any problems or 
complications.  



DISCHARGE MEDICATIONS:

1.  Glimepiride.

2.  Levothyroxine.

3.  Excedrin Extra Strength.

4.  Metformin.

5.  Nitroglycerin.

6.  Testosterone Cypionate.

7.  Trulicity. 

8.  Guaifenesin.

9.  Levofloxacin.  



#[id number]

[facility]

## 2020-02-06 ENCOUNTER — HOSPITAL ENCOUNTER (OUTPATIENT)
Dept: HOSPITAL 39 - ECHO | Age: 65
End: 2020-02-06
Attending: INTERNAL MEDICINE
Payer: COMMERCIAL

## 2020-02-06 DIAGNOSIS — I50.1: ICD-10-CM

## 2020-02-06 DIAGNOSIS — I51.89: Primary | ICD-10-CM

## 2020-02-06 DIAGNOSIS — I51.7: ICD-10-CM

## 2020-02-06 DIAGNOSIS — R06.09: ICD-10-CM

## 2020-02-06 DIAGNOSIS — I50.30: ICD-10-CM

## 2020-05-19 ENCOUNTER — HOSPITAL ENCOUNTER (OUTPATIENT)
Dept: HOSPITAL 39 - CT | Age: 65
End: 2020-05-19
Attending: INTERNAL MEDICINE
Payer: COMMERCIAL

## 2020-05-19 DIAGNOSIS — J98.4: Primary | ICD-10-CM

## 2020-05-19 NOTE — CT
EXAM DESCRIPTION: 



Chest w/o Contrast



CLINICAL HISTORY: 



MULTIPLE PULMONARY NODULES



COMPARISON: 



January 9, 2020



TECHNIQUE: 



Chest CT was performed without IV contrast.  This exam was

performed according to our departmental dose-optimization

program, which includes automated exposure control, adjustment of

the mA and/or kV according to patient size and/or use of

iterative reconstruction technique.



FINDINGS:



Coronary artery calcifications. No thoracic aortic aneurysm. No

hiatal hernia. No pleural or pericardial effusion. Limited

sensitivity for detection of adenopathy due to lack of IV

contrast, no mediastinal or hilar adenopathy is seen.



The central airways are clear. No airspace consolidation or lung

mass. No suspicious lung nodule with interval resolution of

multiple ill-defined nodular lesion seen in both lungs on the

prior chest CT.



Visualized portions of the upper abdomen are unremarkable for

noncontrast technique. No fracture or pneumothorax.



IMPRESSION: 



No suspicious lung nodule with interval resolution of multiple

ill-defined nodules seen on patient's prior chest CT.



Coronary artery disease.



Electronically signed by:  Sanjiv Vazquez MD  5/19/2020 8:23 AM CDT

Workstation: [id number]

## 2020-06-25 ENCOUNTER — HOSPITAL ENCOUNTER (OUTPATIENT)
Dept: HOSPITAL 39 - US | Age: 65
End: 2020-06-25
Attending: THORACIC SURGERY (CARDIOTHORACIC VASCULAR SURGERY)
Payer: COMMERCIAL

## 2020-06-25 DIAGNOSIS — I65.29: Primary | ICD-10-CM

## 2020-06-25 NOTE — US
EXAM DESCRIPTION: 

Carotid Duplex: ULTRASOUND.



CLINICAL HISTORY:

64 years Male CAROTID ARTERY STENOSIS



COMPARISON:

None.



TECHNIQUE: 

Transcutaneous scanning utilizing gray-scale and Doppler modes to

evaluate the bilateral carotid systems and vertebral arteries. 

Percentage of diameter of stenosis or no stenosis recorded will

be based upon NASCET criteria.



FINDINGS: 

Peak systolic/end diastolic (CM-Sec) 

CCA Right 73/8 Left 73/20. 

ICA Right proximal 42/9, distal 60/10. Left proximal 44/10,

Distal 44/15.

Vertebral Right 41/13 Left 28/0.

ECA (PS Only) Right tab 63 left 64.



ICA/CCA peak systolic ratio: Right  0.8  Left 0.6

ICA/CCA end diastolic ratio:  Right   1.3  Left  0.5

Vertebral arteries:  antegrade flow.

Comments: Minimal atherosclerotic calcification. Spectral

broadening in the mid and distal ICAs bilaterally.



IMPRESSION: 

1. Doppler evaluation of the bilateral carotid systems and

vertebral arteries shows no hemodynamically significant stenoses

(less than 70%).

2. No significant amount of plaque in the carotid arteries

bilaterally. Bilateral vertebral arteries showed

antegrade-cephalad flow.



Electronically signed by:  Vikcey Oseguera MD  6/25/2020 11:11 AM

CDT Workstation: [id number]

## 2020-07-17 ENCOUNTER — HOSPITAL ENCOUNTER (OUTPATIENT)
Dept: HOSPITAL 39 - US | Age: 65
End: 2020-07-17
Attending: INTERNAL MEDICINE
Payer: COMMERCIAL

## 2020-07-17 DIAGNOSIS — R60.9: Primary | ICD-10-CM

## 2020-07-17 NOTE — US
EXAM DESCRIPTION: 

Venous,Lower Extremity RT: ULTRASOUND.



CLINICAL HISTORY: 

SWELLING OF LOWER LIMB



COMPARISON: 

None Available.



TECHNIQUE: 

Gray-scale and doppler sonographic evaluation of the deep venous

system of the right lower extremity.



FINDINGS: 

Doppler evaluation shows normal color flow and normal phasicity

and augmentation of the right common femoral vein, femoral vein,

popliteal vein, greater saphenous vein, junction with the CFV.

Also normal color flow and normal phasicity and augmentation of

the  peroneal, and posterior tibial vein. The right lower

extremity deep veins were completely compressible; normal

occlusion with transducer pressure. Gray-scale survey showed no

echogenic thrombus within these veins. 

Interstitial edema in the adipose subcutaneous layer.



IMPRESSION: 

1.  Duplex ultrasound evaluation of the right lower extremity

deep venous system showing no evidence of thrombosis.

2.  Interstitial edema in the subcutaneous adipose layer.



Electronically signed by:  Vickey Oseguera MD  7/17/2020 2:58 PM CDT

Workstation: [id number]

## 2020-08-04 ENCOUNTER — HOSPITAL ENCOUNTER (OUTPATIENT)
Dept: HOSPITAL 39 - GMAL | Age: 65
End: 2020-08-04
Attending: FAMILY MEDICINE
Payer: COMMERCIAL

## 2020-08-04 DIAGNOSIS — Z79.899: ICD-10-CM

## 2020-08-04 DIAGNOSIS — E78.2: ICD-10-CM

## 2020-08-04 DIAGNOSIS — E11.42: ICD-10-CM

## 2020-08-04 DIAGNOSIS — R60.9: ICD-10-CM

## 2020-08-04 DIAGNOSIS — E03.8: Primary | ICD-10-CM

## 2020-12-08 ENCOUNTER — HOSPITAL ENCOUNTER (OUTPATIENT)
Dept: HOSPITAL 39 - GMAL | Age: 65
End: 2020-12-08
Attending: FAMILY MEDICINE
Payer: COMMERCIAL

## 2020-12-08 DIAGNOSIS — Z00.01: Primary | ICD-10-CM
